# Patient Record
Sex: MALE | Race: OTHER | URBAN - METROPOLITAN AREA
[De-identification: names, ages, dates, MRNs, and addresses within clinical notes are randomized per-mention and may not be internally consistent; named-entity substitution may affect disease eponyms.]

---

## 2018-06-08 ENCOUNTER — HOSPITAL ENCOUNTER (EMERGENCY)
Age: 23
Discharge: HOME | End: 2018-06-08
Attending: EMERGENCY MEDICINE
Payer: SELF-PAY

## 2018-06-08 VITALS
TEMPERATURE: 99.2 F | DIASTOLIC BLOOD PRESSURE: 78 MMHG | WEIGHT: 145 LBS | RESPIRATION RATE: 18 BRPM | HEART RATE: 70 BPM | SYSTOLIC BLOOD PRESSURE: 116 MMHG | OXYGEN SATURATION: 99 %

## 2018-06-08 DIAGNOSIS — S39.012A BACK STRAIN, INITIAL ENCOUNTER: Primary | ICD-10-CM

## 2018-06-08 LAB

## 2018-06-08 PROCEDURE — 99282 EMERGENCY DEPT VISIT SF MDM: CPT

## 2018-06-08 PROCEDURE — 96372 THER/PROPH/DIAG INJ SC/IM: CPT

## 2018-06-08 PROCEDURE — 74011250636 HC RX REV CODE- 250/636: Performed by: PHYSICIAN ASSISTANT

## 2018-06-08 PROCEDURE — 81003 URINALYSIS AUTO W/O SCOPE: CPT | Performed by: PHYSICIAN ASSISTANT

## 2018-06-08 RX ORDER — NAPROXEN 500 MG/1
500 TABLET ORAL 2 TIMES DAILY WITH MEALS
Qty: 20 TAB | Refills: 0 | Status: SHIPPED | OUTPATIENT
Start: 2018-06-08 | End: 2018-06-18

## 2018-06-08 RX ORDER — CYCLOBENZAPRINE HCL 5 MG
10 TABLET ORAL 3 TIMES DAILY
Qty: 12 TAB | Refills: 0 | Status: SHIPPED | OUTPATIENT
Start: 2018-06-08

## 2018-06-08 RX ORDER — KETOROLAC TROMETHAMINE 30 MG/ML
60 INJECTION, SOLUTION INTRAMUSCULAR; INTRAVENOUS
Status: COMPLETED | OUTPATIENT
Start: 2018-06-08 | End: 2018-06-08

## 2018-06-08 RX ADMIN — KETOROLAC TROMETHAMINE 60 MG: 30 INJECTION, SOLUTION INTRAMUSCULAR at 11:17

## 2018-06-08 NOTE — ED PROVIDER NOTES
EMERGENCY DEPARTMENT HISTORY AND PHYSICAL EXAM    10:32 AM      Date: 6/8/2018  Patient Name: Liza Aquino    History of Presenting Illness     Chief Complaint   Patient presents with    Flank Pain         History Provided By: Patient, Patients mother, Czech speaking, interpreted by nurse Judith Mays    Chief Complaint: low back pain, flank pain  Duration:  Weeks  Timing:  intermittent  Location: low back   Quality: Aching  Severity: Moderate  Modifying Factors: worse with movement  Associated Symptoms: denies any other associated signs or symptoms      Additional History (Context): Liza Aquino is a 20 y.o. male with No significant past medical history who presents with c/o low back pain and b/l flank pain intermittently x 3 weeks. Pt notes the symptoms are worse with movement. Notes the pain is intermittent and will sometimes occur on the R side and sometimes on the L side of his lower back. Denies fever/chills, n/v/d, abdominal pain, dysuria, hematuria, penile discharge, testicular pain, numbness/tingling, weakness. Denies injury or trauma. 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 Did not take any medication PTA. Mom notes \"he's had back pain since he was little\". PCP: None      Past History     Past Medical History:  History reviewed. No pertinent past medical history. Past Surgical History:  History reviewed. No pertinent surgical history. Family History:  History reviewed. No pertinent family history. Social History:  Social History   Substance Use Topics    Smoking status: Current Every Day Smoker     Packs/day: 0.25    Smokeless tobacco: Never Used    Alcohol use Yes       Allergies:  No Known Allergies      Review of Systems       Review of Systems   Constitutional: Negative for chills and fever. Respiratory: Negative for shortness of breath. Cardiovascular: Negative for chest pain. Gastrointestinal: Negative for abdominal pain, nausea and vomiting.    Genitourinary: Positive for flank pain. Negative for difficulty urinating and dysuria. Musculoskeletal: Positive for back pain. Skin: Negative for rash. Neurological: Negative for weakness. All other systems reviewed and are negative. Physical Exam     Visit Vitals    /78    Pulse 70    Temp 99.2 °F (37.3 °C)    Resp 18    Wt 65.8 kg (145 lb)    SpO2 99%         Physical Exam   Constitutional: He appears well-developed and well-nourished. No distress. HENT:   Head: Normocephalic and atraumatic. Neck: Normal range of motion. Neck supple. Cardiovascular: Normal rate, regular rhythm, normal heart sounds and intact distal pulses. Exam reveals no gallop and no friction rub. No murmur heard. Pulmonary/Chest: Effort normal and breath sounds normal. No respiratory distress. He has no wheezes. He has no rales. Abdominal: Soft. Bowel sounds are normal. He exhibits no distension and no mass. There is no tenderness. There is no rebound and no guarding. Hernia confirmed negative in the right inguinal area and confirmed negative in the left inguinal area. No CVA tenderness    Genitourinary: Testes normal and penis normal. Uncircumcised. Genitourinary Comments: Chaperoned by nurse Luc Huang   Musculoskeletal: Normal range of motion. He exhibits no edema. B/l lumbar paravertebrals TTP, no midline tenderness, no saddle anesthesia, no edema      Lymphadenopathy:        Right: No inguinal adenopathy present. Left: No inguinal adenopathy present. Neurological: He is alert. Skin: Skin is warm. No rash noted. He is not diaphoretic. Nursing note and vitals reviewed.         Diagnostic Study Results     Labs -  Recent Results (from the past 12 hour(s))   URINALYSIS W/ RFLX MICROSCOPIC    Collection Time: 06/08/18 10:30 AM   Result Value Ref Range    Color YELLOW      Appearance CLEAR      Specific gravity 1.015 1.005 - 1.030      pH (UA) 6.5 5.0 - 8.0      Protein NEGATIVE  NEG mg/dL    Glucose NEGATIVE  NEG mg/dL    Ketone NEGATIVE  NEG mg/dL    Bilirubin NEGATIVE  NEG      Blood NEGATIVE  NEG      Urobilinogen 1.0 0.2 - 1.0 EU/dL    Nitrites NEGATIVE  NEG      Leukocyte Esterase NEGATIVE  NEG         Radiologic Studies -   No orders to display         Medical Decision Making   I am the first provider for this patient. I reviewed the vital signs, available nursing notes, past medical history, past surgical history, family history and social history. Vital Signs-Reviewed the patient's vital signs. Records Reviewed: Nursing Notes (Time of Review: 10:32 AM)    ED Course: Progress Notes, Reevaluation, and Consults:  10:59 AM Reviewed results with patient and family. Discussed need for close outpatient follow-up. Discussed strict return precautions, including fever, vomiting, or any other medical concerns. Provider Notes (Medical Decision Making): 19 yo M with no significant PMH who presents due to low back pain/flank pain x weeks. Afebrile, VS stable, looks well. No CVA tenderness or abdominal tenderness. No injury/trauma. No midline pain. No red flag symptoms. UA without hematuria or WBCs, does not seem consistent with pyelonephritis or nephrolithiasis. Likely muscular in nature as symptoms are reproducible with palpation and worse with movement. Will d/c with Flexeril and NSAIDs. Stable for d/c with outpatient follow-up and strict return precautions. Diagnosis     Clinical Impression:   1. Back strain, initial encounter        Disposition: home     Follow-up Information     Follow up With Details Comments Contact [facility] EMERGENCY DEPT  If symptoms worsen [address] [place] [id number]  [facility] Schedule an appointment as soon as possible for a visit  [address] [place] [id number] [phone]           Patient's Medications   Start Taking    CYCLOBENZAPRINE (FLEXERIL) 5 MG TABLET    Take 2 Tabs by mouth three (3) times daily. NAPROXEN (NAPROSYN) 500 MG TABLET    Take 1 Tab by mouth two (2) times daily (with meals) for 10 days.    Continue Taking    No medications on file   These Medications have changed    No medications on file   Stop Taking    No medications on file

## 2018-06-08 NOTE — LETTER
[facility] EMERGENCY DEPT 
[facility] [phone] 
[phone] Work/School Note Date: 6/8/2018 To Whom It May concern: 
 
Kathleen Fothergill was seen and treated today in the emergency room by the following provider(s): 
Attending Provider: Dhaval Spears DO Physician Assistant: Den Ortiz. Kathleen Fothergill may return to work on 6/10/18. Sincerely, 
 
 
 
 
Den Ortiz

## 2018-06-08 NOTE — DISCHARGE INSTRUCTIONS
Distensión de la espalda: Instrucciones de cuidado - [ Back Strain: Care Instructions ]  Instrucciones de cuidado    La distensión de la espalda ocurre cuando usted estira demasiado, o fuerza, un músculo de la espalda. Usted puede lesionarse la espalda en un accidente o cuando hace ejercicio o levanta algo. La mayoría de los baljit de espalda mejoran con reposo y Julito. Usted puede cuidarse en el hogar para ayudar a que rodrigues espalda sane. La atención de seguimiento es lucho parte clave de rodrigues tratamiento y seguridad. Asegúrese de hacer y acudir a todas las citas, y llame a rodrigues médico si está teniendo problemas. También es lucho buena idea saber los resultados de los exámenes y mantener lucho lista de los medicamentos que claudette. ¿Cómo puede cuidarse en el hogar? · Trate de permanecer tan activo gail pueda, clair deje de hacer o reduzca cualquier actividad que le produzca dolor. · Colóquese hielo o lucho compresa fría en el músculo adolorido de 10 a 20 minutos cada vez para detener la hinchazón. Trate de hacerlo cada 1 o 2 horas sujata 3 días (cuando esté despierto) o hasta que la hinchazón baje. Póngase un paño sterling entre el hielo y la piel. · Después de 2 o 3 días, coloque lucho almohadilla térmica ajustada a baja temperatura o un paño tibio sobre la espalda. Algunos médicos sugieren que se alterne entre tratamientos calientes y fríos. · [employer] analgésicos (medicamentos para el dolor) exactamente según las indicaciones. ¨ Si el médico le recetó un analgésico, tómelo según las indicaciones. ¨ Si no está tomando un analgésico recetado, pregúntele a rodrigues médico si puede trav dennis de [employer]. · Trate de dormir de lado con lucho almohada entre las piernas. O, colóquese lucho almohada debajo de las rodillas cuando se acueste [place] Estas medidas pueden aliviar el dolor en la parte baja de la espalda. · Elihue Slates a poco a rodrigues nivel habitual de actividades. ¿Cuándo debe pedir ayuda?   Llame al 911 en cualquier momento que considere que necesita atención de [place]. Por ejemplo, llame si:  ? · Es completamente incapaz de  lucho pierna. ?Llame a rodrigues médico ahora mismo o busque atención médica inmediata si:  ? · Tiene síntomas nuevos o peores en las piernas, el abdomen o las nalgas (glúteos). Los síntomas pueden incluir:  ¨ Entumecimiento u hormigueo. ¨ Debilidad. ¨ Dolor. ? · Pierde el control de la vejiga o del intestino. ?Preste especial atención a los cambios en rodrigues darlene y asegúrese de comunicarse con rodrigues médico si no mejora gail se esperaba. ¿Dónde puede encontrar más información en inglés? Cynda Boast a http://cain-hugo.info/. Fede Colmenares I355 en la búsqueda para aprender más acerca de \"Distensión de la espalda: Instrucciones de cuidado - [ Back Strain: Care Instructions ]. \"  Revisado: 21 marzo, 2017  Versión del contenido: 11.4  © 1865-0865 Healthwise, Incorporated. Las instrucciones de cuidado fueron adaptadas bajo licencia por [employer] (which disclaims liability or warranty for this information). Si usted tiene [place] afección médica o sobre estas instrucciones, siempre pregunte a rodrigues profesional de darlene. Healthwise, Incorporated niega toda garantía o responsabilidad por rodrigues uso de esta información. [employer] Activation    Thank you for requesting access to [employer]. Please follow the instructions below to securely access and download your online medical record. [employer] allows you to send messages to your doctor, view your test results, renew your prescriptions, schedule appointments, and more. How Do I Sign Up? 1. In your internet browser, go to [email or web address]  2. Click on the First Time User? Click Here link in the Sign In box. You will be redirect to the New Member Sign Up page. 3. Enter your [employer] Access Code exactly as it appears below. You will not need to use this code after youve completed the sign-up process.  If you do not sign up before the expiration date, you must request a new code. [employer] Access Code: [email or web address]  Expires: 2018 10:26 AM (This is the date your [employer] access code will )    4. Enter the last four digits of your Social Security Number (xxxx) and Date of Birth (mm/dd/yyyy) as indicated and click Submit. You will be taken to the next sign-up page. 5. Create a [employer] ID. This will be your [employer] login ID and cannot be changed, so think of one that is secure and easy to remember. 6. Create a [employer] password. You can change your password at any time. 7. Enter your Password Reset Question and Answer. This can be used at a later time if you forget your password. 8. Enter your e-mail address. You will receive e-mail notification when new information is available in [address]. 9. Click Sign Up. You can now view and download portions of your medical record. 10. Click the Download Summary menu link to download a portable copy of your medical information. Additional Information    If you have questions, please visit the Frequently Asked Questions section of the [employer] website at [email or web address] Remember, [employer] is NOT to be used for urgent needs. For medical emergencies, dial 911.